# Patient Record
Sex: MALE | Race: WHITE | Employment: OTHER | URBAN - NONMETROPOLITAN AREA
[De-identification: names, ages, dates, MRNs, and addresses within clinical notes are randomized per-mention and may not be internally consistent; named-entity substitution may affect disease eponyms.]

---

## 2017-11-14 ENCOUNTER — HOSPITAL ENCOUNTER (OUTPATIENT)
Dept: MRI IMAGING | Age: 67
Discharge: HOME | End: 2017-11-14
Payer: MEDICARE

## 2017-11-14 DIAGNOSIS — M54.12 CERVICAL RADICULOPATHY: ICD-10-CM

## 2017-11-14 DIAGNOSIS — M54.12 RADICULOPATHY, CERVICAL REGION: ICD-10-CM

## 2017-11-14 PROCEDURE — 72141 MRI NECK SPINE W/O DYE: CPT

## 2018-02-28 ENCOUNTER — HOSPITAL ENCOUNTER (OUTPATIENT)
Dept: MRI IMAGING | Age: 68
Discharge: HOME | End: 2018-02-28
Payer: MEDICARE

## 2018-02-28 DIAGNOSIS — M47.812 OSTEOARTHRITIS OF CERVICAL SPINE, UNSPECIFIED SPINAL OSTEOARTHRITIS COMPLICATION STATUS: ICD-10-CM

## 2018-02-28 DIAGNOSIS — M48.02 CERVICAL SPINAL STENOSIS: ICD-10-CM

## 2018-02-28 LAB — POC CREATININE WHOLE BLOOD: 1.2 MG/DL (ref 0.5–1.2)

## 2018-02-28 PROCEDURE — A9579 GAD-BASE MR CONTRAST NOS,1ML: HCPCS | Performed by: ORTHOPAEDIC SURGERY

## 2018-02-28 PROCEDURE — 6360000004 HC RX CONTRAST MEDICATION: Performed by: ORTHOPAEDIC SURGERY

## 2018-02-28 PROCEDURE — 82565 ASSAY OF CREATININE: CPT

## 2018-02-28 PROCEDURE — 72156 MRI NECK SPINE W/O & W/DYE: CPT

## 2018-02-28 RX ADMIN — GADOTERIDOL 20 ML: 279.3 INJECTION, SOLUTION INTRAVENOUS at 11:31

## 2023-01-12 NOTE — H&P
[address] [place] [address] Endoscopy    Pre-Endoscopy H&PE      Patient: Aron Bee    : 1950    Acct#: [de-identified]  Primary Care Physician: No primary care provider on file. Planned Procedure:    []EGD    []Enteroscopy    []PEG    []PEG change    []PEG removal  []Variceal banding    []Biopsy   []Dilation      []Control of bleeding  []Destruction of lesion by [facility]    []Stent Placement  []Foreign Body Removal    []Snare Polypectomy  []Other:       [x]Colonoscopy  []Flex Sigmoid []EUS, rectal      [x]Biopsy   []Dilation      []Control of bleeding  []Destruction of lesion by [facility]    []Stent Placement  []Foreign Body Removal    []Snare Polypectomy  []Other:      Planned Sedation  []Conscious Sedation [x]MAC/Propofol []Anesthesia    []None    Airway:  Adequate for planned sedation    Indication:   diarrhea, hematochezia    History:  Pari Braun is a 77-year-old male seen while hospitalized at [facility] on 22 with abdominal pain, diarrhea, nausea, and vomiting. CT scan demonstrated colitis. GI panel was negative. He was treated with cipro and Flagyl. He was also placed on pantoprazole. He was taking Aspirin and Effient. When seen by Yony Rojas CNP on 22, bowel had improved. He was still having some borborygmi. He is still having diarrhea. I performed a colonoscopy 22 and removed a single TA. He had a nodular stricture in the ascending colon that could not be traversed with the adult colonoscope. Multiple biopsies were taken showing only inflammation. Colonoscopy was aborted at this point. I recommended that he stop baby aspirin as this appears to be an NSAID-induced stricture and have a repeat colonoscopy with dilation. He has held his Effient 10 days. Physical Exam:  VITALS: BP (!) 189/79   Pulse 61   Temp 97.2 °F (36.2 °C) (Temporal)   Resp 16   Ht 5' 8\" (1.727 m)   Wt 220 lb 6.4 oz (100 kg)   SpO2 95%   BMI 33.51 kg/m²   The patient is a 74 y.o. male in no acute distress. HEAD: Normal cephalic/atraumatic. Extra-occular motions intact bilaterally. NECK: No lymphadenopathy or bruits. CHEST: Rises equally on inspiration. Clear to auscultation bilaterally. CARDIOVASCULAR: Regular rate and rhythm without murmurs, rubs or gallops. ABDOMEN: Soft, nontender, and nondistended with normal bowel sounds. No Hepatosplemomegaly. UPPER EXTREMITIES: no cyanosis, clubbing, or edema. DERM: no rash or jaundice. LOWER EXTREMITIES: no cyanosis, clubbing, or edema. NEURO: Alert and oriented times four. Patient moves all extremities and has gross sensation in all extremities. ASA: ASA 3 - Patient with moderate systemic disease with functional limitations    Past Medical History:    No past medical history on file. Past Surgical History:    No past surgical history on file. Allergies:  Patient has no allergy information on record. Home Meds:  Prior to Admission medications    Medication Sig Start Date End Date Taking? Authorizing Provider   metFORMIN (GLUCOPHAGE) 1000 MG tablet Take 1,000 mg by mouth 2 times daily (with meals)   Yes Historical Provider, MD   gabapentin (NEURONTIN) 600 MG tablet Take 600 mg by mouth 3 times daily.    Yes Historical Provider, MD   losartan (COZAAR) 100 MG tablet Take 100 mg by mouth daily   Yes Historical Provider, MD   metoprolol (LOPRESSOR) Take 50 mg by mouth 2 times daily   Yes Historical Provider, MD   prasugrel (EFFIENT) 5 MG TABS Take 5 mg by mouth daily   Yes Historical Provider, MD   Rosuvastatin Calcium 20 MG CPSP Take by mouth   Yes Historical Provider, MD   silodosin (RAPAFLO) 8 MG CAPS Take 8 mg by mouth every evening   Yes Historical Provider, MD   Cholecalciferol (VITAMIN D3) 125 MCG (5000 UT) TABS Take by mouth   Yes Historical Provider, MD   vitamin B-12 (CYANOCOBALAMIN) 500 MCG tablet Take 500 mcg by mouth daily   Yes Historical Provider, MD   empagliflozin (JARDIANCE) 10 MG tablet Take 10 mg by mouth daily   Yes Historical Provider, MD   insulin NPH (HUMULIN N;NOVOLIN N) 100 UNIT/ML injection vial Inject into the skin 2 times daily (before meals)   Yes Historical Provider, MD     Social History:   Social History     Socioeconomic History    Marital status:      Spouse name: Not on file    Number of children: Not on file    Years of education: Not on file    Highest education level: Not on file   Occupational History    Not on file   Tobacco Use    Smoking status: Not on file    Smokeless tobacco: Not on file   Substance and Sexual Activity    Alcohol use: Not on file    Drug use: Not on file    Sexual activity: Not on file   Other Topics Concern    Not on file   Social History Narrative    Not on file     Social Determinants of Health     Financial Resource Strain: Not on file   Food Insecurity: Not on file   Transportation Needs: Not on file   Physical Activity: Not on file   Stress: Not on file   Social Connections: Not on file   Intimate Partner Violence: Not on file   Housing Stability: Not on file     Family History:   No GI malignancies     ROS:  GENERAL: No fever or chills. NEURO: No headache or seizure. EYES: No diplopia or vision loss. CARDIOVASCULAR: No chest pain or palpitations. RESPIRATORY: No dyspnea or cough. GI: NO melena. NO hematochezia   : No dysuria or hematuria. GYN:  Not appropriate. MUSCULOSKELETAL: No new arthralgias or myalgias  DERM: No rash or jaundice. ENDOCRINE: No polyuria or polydipsia. PSYCH: No anxiety or depression. Informed Consent:  The risks and benefits of the procedure have been discussed with either the patient or if they cannot consent, their representative. Assessment:  Patient examined and appropriate for planned sedation and procedure. Plan:  Proceed with planned sedation and procedure.     Larisa Bruno MD  1:42 PM 1/13/2023

## 2023-01-13 ENCOUNTER — ANESTHESIA (OUTPATIENT)
Dept: ENDOSCOPY | Age: 73
End: 2023-01-13
Payer: MEDICARE

## 2023-01-13 ENCOUNTER — ANESTHESIA EVENT (OUTPATIENT)
Dept: ENDOSCOPY | Age: 73
End: 2023-01-13
Payer: MEDICARE

## 2023-01-13 ENCOUNTER — HOSPITAL ENCOUNTER (OUTPATIENT)
Age: 73
Setting detail: OUTPATIENT SURGERY
Discharge: HOME | End: 2023-01-13
Attending: INTERNAL MEDICINE | Admitting: INTERNAL MEDICINE
Payer: MEDICARE

## 2023-01-13 VITALS
SYSTOLIC BLOOD PRESSURE: 150 MMHG | WEIGHT: 220.4 LBS | OXYGEN SATURATION: 96 % | HEART RATE: 55 BPM | TEMPERATURE: 96.8 F | RESPIRATION RATE: 20 BRPM | HEIGHT: 68 IN | DIASTOLIC BLOOD PRESSURE: 70 MMHG | BODY MASS INDEX: 33.4 KG/M2

## 2023-01-13 LAB
EKG ATRIAL RATE: 62 BPM
EKG P AXIS: 24 DEGREES
EKG P-R INTERVAL: 168 MS
EKG Q-T INTERVAL: 426 MS
EKG QRS DURATION: 164 MS
EKG QTC CALCULATION (BAZETT): 432 MS
EKG R AXIS: -57 DEGREES
EKG T AXIS: 23 DEGREES
EKG VENTRICULAR RATE: 62 BPM
GLUCOSE BLD-MCNC: 168 MG/DL (ref 70–108)

## 2023-01-13 PROCEDURE — 2500000003 HC RX 250 WO HCPCS

## 2023-01-13 PROCEDURE — 3700000000 HC ANESTHESIA ATTENDED CARE: Performed by: INTERNAL MEDICINE

## 2023-01-13 PROCEDURE — C1726 CATH, BAL DIL, NON-VASCULAR: HCPCS | Performed by: INTERNAL MEDICINE

## 2023-01-13 PROCEDURE — 3609009400 HC COLONOSCOPY WITH DILATION: Performed by: INTERNAL MEDICINE

## 2023-01-13 PROCEDURE — 2580000003 HC RX 258

## 2023-01-13 PROCEDURE — 7100000011 HC PHASE II RECOVERY - ADDTL 15 MIN: Performed by: INTERNAL MEDICINE

## 2023-01-13 PROCEDURE — 3700000001 HC ADD 15 MINUTES (ANESTHESIA): Performed by: INTERNAL MEDICINE

## 2023-01-13 PROCEDURE — 6360000002 HC RX W HCPCS

## 2023-01-13 PROCEDURE — 88305 TISSUE EXAM BY PATHOLOGIST: CPT

## 2023-01-13 PROCEDURE — 93005 ELECTROCARDIOGRAM TRACING: CPT | Performed by: INTERNAL MEDICINE

## 2023-01-13 PROCEDURE — 7100000010 HC PHASE II RECOVERY - FIRST 15 MIN: Performed by: INTERNAL MEDICINE

## 2023-01-13 PROCEDURE — 2709999900 HC NON-CHARGEABLE SUPPLY: Performed by: INTERNAL MEDICINE

## 2023-01-13 PROCEDURE — 2580000003 HC RX 258: Performed by: INTERNAL MEDICINE

## 2023-01-13 PROCEDURE — 3609019800 HC COLONOSCOPY WITH SUBMUCOSAL INJECTION: Performed by: INTERNAL MEDICINE

## 2023-01-13 PROCEDURE — 82948 REAGENT STRIP/BLOOD GLUCOSE: CPT

## 2023-01-13 RX ORDER — SODIUM CHLORIDE 9 MG/ML
25 INJECTION, SOLUTION INTRAVENOUS PRN
Status: DISCONTINUED | OUTPATIENT
Start: 2023-01-13 | End: 2023-01-13 | Stop reason: HOSPADM

## 2023-01-13 RX ORDER — PRASUGREL 5 MG/1
5 TABLET, FILM COATED ORAL DAILY
COMMUNITY

## 2023-01-13 RX ORDER — GABAPENTIN 600 MG/1
600 TABLET ORAL 3 TIMES DAILY
COMMUNITY

## 2023-01-13 RX ORDER — PROPOFOL 10 MG/ML
INJECTION, EMULSION INTRAVENOUS PRN
Status: DISCONTINUED | OUTPATIENT
Start: 2023-01-13 | End: 2023-01-13 | Stop reason: SDUPTHER

## 2023-01-13 RX ORDER — SILODOSIN 8 MG/1
8 CAPSULE ORAL EVERY EVENING
COMMUNITY

## 2023-01-13 RX ORDER — SODIUM CHLORIDE 9 MG/ML
INJECTION, SOLUTION INTRAVENOUS CONTINUOUS PRN
Status: DISCONTINUED | OUTPATIENT
Start: 2023-01-13 | End: 2023-01-13 | Stop reason: SDUPTHER

## 2023-01-13 RX ORDER — LOSARTAN POTASSIUM 100 MG/1
100 TABLET ORAL DAILY
COMMUNITY

## 2023-01-13 RX ORDER — SODIUM CHLORIDE 0.9 % (FLUSH) 0.9 %
5-40 SYRINGE (ML) INJECTION EVERY 12 HOURS SCHEDULED
Status: DISCONTINUED | OUTPATIENT
Start: 2023-01-13 | End: 2023-01-13 | Stop reason: HOSPADM

## 2023-01-13 RX ORDER — SODIUM CHLORIDE 0.9 % (FLUSH) 0.9 %
5-40 SYRINGE (ML) INJECTION PRN
Status: DISCONTINUED | OUTPATIENT
Start: 2023-01-13 | End: 2023-01-13 | Stop reason: HOSPADM

## 2023-01-13 RX ORDER — SODIUM CHLORIDE 9 MG/ML
INJECTION, SOLUTION INTRAVENOUS CONTINUOUS
Status: DISCONTINUED | OUTPATIENT
Start: 2023-01-13 | End: 2023-01-13 | Stop reason: HOSPADM

## 2023-01-13 RX ORDER — CHOLECALCIFEROL (VITAMIN D3) 125 MCG
500 CAPSULE ORAL DAILY
COMMUNITY

## 2023-01-13 RX ORDER — LIDOCAINE HYDROCHLORIDE 20 MG/ML
INJECTION, SOLUTION INFILTRATION; PERINEURAL PRN
Status: DISCONTINUED | OUTPATIENT
Start: 2023-01-13 | End: 2023-01-13 | Stop reason: SDUPTHER

## 2023-01-13 RX ADMIN — PROPOFOL 30 MG: 10 INJECTION, EMULSION INTRAVENOUS at 14:28

## 2023-01-13 RX ADMIN — PROPOFOL 30 MG: 10 INJECTION, EMULSION INTRAVENOUS at 14:34

## 2023-01-13 RX ADMIN — PROPOFOL 30 MG: 10 INJECTION, EMULSION INTRAVENOUS at 14:19

## 2023-01-13 RX ADMIN — PROPOFOL 30 MG: 10 INJECTION, EMULSION INTRAVENOUS at 14:40

## 2023-01-13 RX ADMIN — LIDOCAINE HYDROCHLORIDE 100 MG: 20 INJECTION, SOLUTION INFILTRATION; PERINEURAL at 14:07

## 2023-01-13 RX ADMIN — PROPOFOL 30 MG: 10 INJECTION, EMULSION INTRAVENOUS at 14:23

## 2023-01-13 RX ADMIN — SODIUM CHLORIDE: 9 INJECTION, SOLUTION INTRAVENOUS at 14:07

## 2023-01-13 RX ADMIN — LIDOCAINE HYDROCHLORIDE 50 MG: 20 INJECTION, SOLUTION INFILTRATION; PERINEURAL at 14:20

## 2023-01-13 RX ADMIN — PROPOFOL 50 MG: 10 INJECTION, EMULSION INTRAVENOUS at 14:08

## 2023-01-13 RX ADMIN — SODIUM CHLORIDE 25 ML: 9 INJECTION, SOLUTION INTRAVENOUS at 13:44

## 2023-01-13 RX ADMIN — PROPOFOL 30 MG: 10 INJECTION, EMULSION INTRAVENOUS at 14:13

## 2023-01-13 ASSESSMENT — PAIN - FUNCTIONAL ASSESSMENT
PAIN_FUNCTIONAL_ASSESSMENT: NONE - DENIES PAIN
PAIN_FUNCTIONAL_ASSESSMENT: NONE - DENIES PAIN

## 2023-01-13 ASSESSMENT — PAIN SCALES - GENERAL: PAINLEVEL_OUTOF10: 0

## 2023-01-13 NOTE — ANESTHESIA POSTPROCEDURE EVALUATION
Department of Anesthesiology  Postprocedure Note    Patient: Rafa Rubin  MRN: [id number]  YOB: 1950  Date of evaluation: 1/13/2023      Procedure Summary     Date: 01/13/23 Room / Location: [place] [address] / [facility]    Anesthesia Start: 1406 Anesthesia Stop: 1449    Procedures:       COLONOSCOPY WITH DILATION      COLONOSCOPY SUBMUCOSAL/BOTOX INJECTION Diagnosis:       Diarrhea, unspecified type      (Diarrhea, unspecified type [R19.7])    Surgeons: Ashish Wilson MD Responsible Provider: Oracio Toledo DO    Anesthesia Type: MAC ASA Status: 3          Anesthesia Type: MAC    Octavia Phase I: Octavia Score: 10    Octavia Phase II:        Anesthesia Post Evaluation    Patient location during evaluation: bedside  Patient participation: complete - patient participated  Level of consciousness: awake and alert  Pain score: 0  Airway patency: patent  Nausea & Vomiting: no nausea  Complications: no  Cardiovascular status: hemodynamically stable  Respiratory status: acceptable  Hydration status: euvolemic

## 2023-01-13 NOTE — ANESTHESIA PRE PROCEDURE
(Q81.162) Other secondary cataract, bilateral - Assesment : Mild posterior capsule opacification present. - Plan : Monitor for Changes. Advised patient to call our office with decreased vision or increased symptoms. Department of Anesthesiology  Preprocedure Note       Name:  Pamela Rodriguez   Age:  74 y.o.  :  1950                                          MRN:  [id number]         Date:  2023      Surgeon: Abdias Hernandez):  Liat Whiting MD    Procedure: Procedure(s):  COLONOSCOPY    Medications prior to admission:   Prior to Admission medications    Medication Sig Start Date End Date Taking? Authorizing Provider   metFORMIN (GLUCOPHAGE) 1000 MG tablet Take 1,000 mg by mouth 2 times daily (with meals)   Yes Historical Provider, MD   gabapentin (NEURONTIN) 600 MG tablet Take 600 mg by mouth 3 times daily.    Yes Historical Provider, MD   losartan (COZAAR) 100 MG tablet Take 100 mg by mouth daily   Yes Historical Provider, MD   metoprolol (LOPRESSOR) Take 50 mg by mouth 2 times daily   Yes Historical Provider, MD   prasugrel (EFFIENT) 5 MG TABS Take 5 mg by mouth daily   Yes Historical Provider, MD   Rosuvastatin Calcium 20 MG CPSP Take by mouth   Yes Historical Provider, MD   silodosin (RAPAFLO) 8 MG CAPS Take 8 mg by mouth every evening   Yes Historical Provider, MD   Cholecalciferol (VITAMIN D3) 125 MCG (5000 UT) TABS Take by mouth   Yes Historical Provider, MD   vitamin B-12 (CYANOCOBALAMIN) 500 MCG tablet Take 500 mcg by mouth daily   Yes Historical Provider, MD   empagliflozin (JARDIANCE) 10 MG tablet Take 10 mg by mouth daily   Yes Historical Provider, MD   insulin NPH (HUMULIN N;NOVOLIN N) 100 UNIT/ML injection vial Inject into the skin 2 times daily (before meals)   Yes Historical Provider, MD       Current medications:    Current Facility-Administered Medications   Medication Dose Route Frequency Provider Last Rate Last Admin    sodium chloride flush 0.9 % injection 5-40 mL  5-40 mL IntraVENous 2 times per day Liat Whiting MD        sodium chloride flush 0.9 % injection 5-40 mL  5-40 mL IntraVENous PRN Liat Whiting MD        0.9 % sodium chloride infusion  25 mL IntraVENous PRN Ashish MURO MD Steve        0.9 % sodium chloride infusion   IntraVENous Continuous Larisa Bruno MD           Allergies:  No Known Allergies    Problem List:  There is no problem list on file for this patient. Past Medical History:        Diagnosis Date    Diabetes mellitus ([address]     Hyperlipidemia     Hypertension        Past Surgical History:        Procedure Laterality Date    BACK SURGERY      COLONOSCOPY         Social History:    Social History     Tobacco Use    Smoking status: Former     Types: Cigarettes     Quit date: 1970     Years since quittin.0    Smokeless tobacco: Never   Substance Use Topics    Alcohol use: Never                                Counseling given: Not Answered      Vital Signs (Current):   Vitals:    23 1300 23 1321   BP: (!) 192/79 (!) 189/79   Pulse: 54 61   Resp: 16 16   Temp:  36.2 °C (97.2 °F)   TempSrc:  Temporal   SpO2: 95% 95%   Weight: 220 lb 6.4 oz (100 kg)    Height: 5' 8\" (1.727 m)                                               BP Readings from Last 3 Encounters:   23 (!) 189/79       NPO Status: Time of last liquid consumption: 1030 (sip of water with meds)                        Time of last solid consumption: 1200                        Date of last liquid consumption: 23                        Date of last solid food consumption: 23    BMI:   Wt Readings from Last 3 Encounters:   23 220 lb 6.4 oz (100 kg)     Body mass index is 33.51 kg/m².     CBC:   Lab Results   Component Value Date/Time    WBC 7.5 2022 11:30 AM    WBC 7.6 10/19/2022 10:43 AM    RBC 4.54 2022 11:30 AM    HGB 12.7 2022 11:30 AM    HCT 38.8 2022 11:30 AM    MCV 85.5 2022 11:30 AM    RDW 15.1 2022 11:30 AM     2022 11:30 AM     10/19/2022 10:43 AM       CMP:   Lab Results   Component Value Date/Time     2022 10:05 AM    K 4.9 2022 10:05 AM     2022 10:05 AM    CO2 28 09/19/2022 10:05 AM    BUN 25 09/19/2022 10:05 AM    CREATININE 1.18 09/19/2022 10:05 AM    AGRATIO 1.4 04/25/2022 10:36 AM    GLUCOSE 126 09/19/2022 10:05 AM    PROT 6.4 09/19/2022 10:05 AM    CALCIUM 10.2 09/19/2022 10:05 AM    BILITOT 0.3 09/19/2022 10:05 AM    ALKPHOS 82 09/19/2022 10:05 AM    ALKPHOS 65 04/25/2022 10:36 AM    AST 15 09/19/2022 10:05 AM    ALT 16 09/19/2022 10:05 AM       POC Tests: No results for input(s): POCGLU, POCNA, POCK, POCCL, POCBUN, POCHEMO, POCHCT in the last 72 hours. Coags:   Lab Results   Component Value Date/Time    PROTIME 12.2 04/25/2022 11:26 AM    INR 1.06 04/25/2022 11:26 AM    APTT 29.2 04/25/2022 11:26 AM       HCG (If Applicable): No results found for: PREGTESTUR, PREGSERUM, HCG, HCGQUANT     ABGs: No results found for: PHART, PO2ART, EMA2EVL, LZY6RTF, BEART, H2MUEVJJ     Type & Screen (If Applicable):  No results found for: LABABO, LABRH    Drug/Infectious Status (If Applicable):  No results found for: HIV, HEPCAB    COVID-19 Screening (If Applicable):   Lab Results   Component Value Date/Time    COVID19 Not Detected 04/25/2022 09:52 AM           Anesthesia Evaluation    Airway: Mallampati: II  TM distance: >3 FB   Neck ROM: full  Mouth opening: > = 3 FB   Dental:          Pulmonary:                              Cardiovascular:  Exercise tolerance: good (>4 METS),   (+) hypertension:, CABG/stent:, CHF:, hyperlipidemia      ECG reviewed  Rhythm: irregular  Rate: abnormal  Echocardiogram reviewed  Stress test reviewed       Beta Blocker:  Dose within 24 Hrs      ROS comment: 01/13/2023: 12 lead ekg shows bifasicular block. Obtaining records from outside hospital    04/06/22 TTE: EF= 55%     Neuro/Psych:               GI/Hepatic/Renal:             Endo/Other:    (+) DiabetesType II DM, well controlled, , .                 Abdominal:             Vascular:           Other Findings:           Anesthesia Plan      MAC     ASA 3             Anesthetic plan and risks discussed with patient and spouse. Plan discussed with attending and CRNA.                     AGNIESZKA Khoury - CRNA   1/13/2023

## 2023-01-13 NOTE — PROGRESS NOTES
1458  pt. Awake, oriented and passing gas on arrival to phase II. Pt. Denies pain. 1500  wife at bedside. [phone]  discharge instructions given. Pt. Angi Rizzo understanding. 1540  phase II criteria met. Pt. Stable. Pt. Discharged per wheelchair with nurse. Pt. Tolerated well.

## 2023-01-13 NOTE — PROGRESS NOTES
Scope 605 CF. Colonoscopy completed, 15mm balloon dilation complete, tolerated well. 1 Bx taken, 1 polyp taken, 2 jars labeled and sent to lab for processing. Endoscopic tattoo 1 ml ink injected. Photos taken.

## 2023-01-13 NOTE — OP NOTE
A (Catheter Cryabl 28mm Arctic Front Adv Pro) catheter was inserted at the left atrium.  [facility] Endoscopy    Patient: Mounika Riley  : 1950  Acct#: [de-identified]  Date of evaluation: 2023  Primary Care Physician: Jeffy Boyer MD     Procedure:    []EGD    []Enteroscopy    []Biopsy   []Dilation      []PEG    []PEG change    []PEG removal  []Variceal banding    []Control of bleeding  []Destruction of lesion by [facility]    []Stent Placement  []Foreign Body Removal    []Snare Polypectomy  []Other:     []Aborted:        [x]Colonoscopy  []Flex Sigmoid []EUS, rectal     [x]Biopsy   []Snare Polypectomy    []Control of bleeding  []Destruction of lesion by [facility]    []Stent Placement  []Foreign Body Removal    [x]Dilation    []Other:    []Aborted:   [x]Tattoo    Indication:   diarrhea, hematochezia    History:  Zee Sharma is a 68-year-old male seen while hospitalized at [facility] on 22 with abdominal pain, diarrhea, nausea, and vomiting. CT scan demonstrated colitis. GI panel was negative. He was treated with cipro and Flagyl. He was also placed on pantoprazole. He was taking Aspirin and Effient. When seen by Deonte Wilkerson CNP on 22, bowel had improved. He was still having some borborygmi. He is still having diarrhea. I attempted a colonoscopy 22 and removed a single TA. He had a nodular stricture in the ascending colon that could not be traversed with the adult colonoscope. Multiple biopsies were taken showing only inflammation. Colonoscopy was aborted at this point. I recommended that he stop baby aspirin as this appears to be an NSAID-induced stricture and have a repeat colonoscopy with dilation. He has held his Effient 10 days. Physical Exam:  VITALS: BP (!) 189/79   Pulse 61   Temp 97.2 °F (36.2 °C) (Temporal)   Resp 16   Ht 5' 8\" (1.727 m)   Wt 220 lb 6.4 oz (100 kg)   SpO2 95%   BMI 33.51 kg/m²   The patient is a 74 y.o. male in no acute distress. HEAD: Normal cephalic/atraumatic. Extra-occular motions intact bilaterally. NECK: No lymphadenopathy or bruits. CHEST: Rises equally on inspiration. Clear to auscultation bilaterally. CARDIOVASCULAR: Regular rate and rhythm without murmurs, rubs or gallops. ABDOMEN: Soft, nontender, and nondistended with normal bowel sounds. No Hepatosplemomegaly. UPPER EXTREMITIES: no cyanosis, clubbing, or edema. DERM: no rash or jaundice. LOWER EXTREMITIES: no cyanosis, clubbing, or edema. NEURO: Alert and oriented times four. Patient moves all extremities and has gross sensation in all extremities. ASA: ASA 3 - Patient with moderate systemic disease with functional limitations    MEDICATION:    MAC/Propofol/Anesthesia:  Yes     Photo:  Yes  Biopsy:  Yes      Description of Procedure: The risks and benefits of the procedure were described to the patient or their representative if unable to give consent including but not limited to bleeding, infection, poking a hole someplace requiring surgery to fix it, having a reaction to medication, and death. The patient or their representative if unable to give consent understood these risks and provided informed consent. Airway was assessed and is adequate for the planned sedation. Anesthesia: MAC  Estimated Blood Loss: less than 50   Complications: None  Specimens: Was Obtained:  see below     Sedation was administered by anesthesia who monitored the patient during the procedure. The patient was placed in the left lateral decubitus position. The perianal area was inspected, and a digital rectal exam was performed. A forward-viewing Olympus adult colonoscope was lubricated and inserted through the anus into the rectum. Under direct visualization, this was advanced to the cecum. Cecal base was identified by the ileocecal valve and appendiceal orifice. The scope was then slowly withdrawn with adequate views of mucosal surfaces. The scope was retroflexed in the rectum.  Findings and maneuvers are listed in impression below. The patient tolerated the procedure well. The scope was removed. The patient was moved to the recovery area. There were no immediate complications. IMPRESSION:  1. Two ~10-11 mm strictures in the distal ascending colon ~5 cm apart dilated with TTS balloon to 15 mm, biopsied. Tattoo applied distally. 2.  Two 3-4 mm polyps between the two strictures removed with biopsy forceps. 3.  Left-sided diverticulosis  4. LRA removed 12/1/22   5. Poor preparation made adequate with copious lavage (Sutab). RECOMMENDATIONS:    1. Await pathology  2. Repeat colonoscopy in 5 years if the patient is in good health using either Suprep or a 2-day prep. 3.  High fiber diet  4. Resume Effient tomorrow  5. Follow-up with Alesia Davis CNP in 6-8 weeks at  [address] [place] [address].  [facility], [address] [place] [address]   Office will call patient to schedule    Thiago Murillo MD  2:54 PM 1/13/2023

## 2023-01-13 NOTE — DISCHARGE INSTRUCTIONS
1. Await pathology  2. Repeat colonoscopy in 5 years if the patient is in good health   3. High fiber diet  4. Resume Effient tomorrow  5. Follow-up with Alesia Davis CNP in 6-8 weeks at  [address] [facility].  [address], [address] [facility]   Office will call patient to schedule

## (undated) DEVICE — ESOPHAGEAL/PYLORIC/COLONIC/BILIARY WIREGUIDED BALLOON DILATATION CATHETER: Brand: CRE™ PRO

## (undated) DEVICE — GLOVE ORTHO 8   MSG9480